# Patient Record
Sex: FEMALE | Race: BLACK OR AFRICAN AMERICAN | Employment: UNEMPLOYED | URBAN - METROPOLITAN AREA
[De-identification: names, ages, dates, MRNs, and addresses within clinical notes are randomized per-mention and may not be internally consistent; named-entity substitution may affect disease eponyms.]

---

## 2019-02-04 PROCEDURE — 87086 URINE CULTURE/COLONY COUNT: CPT | Performed by: OBSTETRICS & GYNECOLOGY

## 2019-02-04 PROCEDURE — 86900 BLOOD TYPING SEROLOGIC ABO: CPT | Performed by: OBSTETRICS & GYNECOLOGY

## 2019-02-04 PROCEDURE — 87389 HIV-1 AG W/HIV-1&-2 AB AG IA: CPT | Performed by: OBSTETRICS & GYNECOLOGY

## 2019-02-04 PROCEDURE — 86901 BLOOD TYPING SEROLOGIC RH(D): CPT | Performed by: OBSTETRICS & GYNECOLOGY

## 2019-02-04 PROCEDURE — 87088 URINE BACTERIA CULTURE: CPT | Performed by: OBSTETRICS & GYNECOLOGY

## 2019-02-04 PROCEDURE — 85660 RBC SICKLE CELL TEST: CPT | Performed by: OBSTETRICS & GYNECOLOGY

## 2019-02-04 PROCEDURE — 86850 RBC ANTIBODY SCREEN: CPT | Performed by: OBSTETRICS & GYNECOLOGY

## 2019-02-04 PROCEDURE — 87186 SC STD MICRODIL/AGAR DIL: CPT | Performed by: OBSTETRICS & GYNECOLOGY

## 2019-02-13 PROBLEM — Z34.00 ENCOUNTER FOR SUPERVISION PREGNANCY IN PRIMIGRAVIDA, ANTEPARTUM: Status: ACTIVE | Noted: 2019-02-13

## 2019-02-13 PROBLEM — Z34.00 ENCOUNTER FOR SUPERVISION PREGNANCY IN PRIMIGRAVIDA, ANTEPARTUM (HCC): Status: ACTIVE | Noted: 2019-02-13

## 2019-03-04 PROBLEM — N30.00 ACUTE CYSTITIS WITHOUT HEMATURIA: Status: ACTIVE | Noted: 2019-03-04

## 2019-03-04 PROBLEM — A74.9 CHLAMYDIA INFECTION: Status: ACTIVE | Noted: 2019-03-04

## 2019-03-04 PROCEDURE — 87086 URINE CULTURE/COLONY COUNT: CPT | Performed by: OBSTETRICS & GYNECOLOGY

## 2019-04-30 PROCEDURE — 87205 SMEAR GRAM STAIN: CPT | Performed by: EMERGENCY MEDICINE

## 2019-04-30 PROCEDURE — 87077 CULTURE AEROBIC IDENTIFY: CPT | Performed by: EMERGENCY MEDICINE

## 2019-04-30 PROCEDURE — 87070 CULTURE OTHR SPECIMN AEROBIC: CPT | Performed by: EMERGENCY MEDICINE

## 2019-04-30 PROCEDURE — 87076 CULTURE ANAEROBE IDENT EACH: CPT | Performed by: EMERGENCY MEDICINE

## 2019-04-30 PROCEDURE — 87075 CULTR BACTERIA EXCEPT BLOOD: CPT | Performed by: EMERGENCY MEDICINE

## 2019-04-30 PROCEDURE — 87147 CULTURE TYPE IMMUNOLOGIC: CPT | Performed by: EMERGENCY MEDICINE

## 2019-04-30 PROCEDURE — 87186 SC STD MICRODIL/AGAR DIL: CPT | Performed by: EMERGENCY MEDICINE

## 2019-07-02 PROCEDURE — 87389 HIV-1 AG W/HIV-1&-2 AB AG IA: CPT | Performed by: NURSE PRACTITIONER

## 2019-08-01 PROBLEM — O99.013 ANEMIA AFFECTING PREGNANCY IN THIRD TRIMESTER: Status: ACTIVE | Noted: 2019-08-01

## 2019-08-01 PROBLEM — O99.013 ANEMIA AFFECTING PREGNANCY IN THIRD TRIMESTER (HCC): Status: ACTIVE | Noted: 2019-08-01

## 2019-08-01 PROCEDURE — 87086 URINE CULTURE/COLONY COUNT: CPT | Performed by: OBSTETRICS & GYNECOLOGY

## 2019-08-16 PROCEDURE — 87086 URINE CULTURE/COLONY COUNT: CPT | Performed by: OBSTETRICS & GYNECOLOGY

## 2019-09-30 PROBLEM — O99.013 ANEMIA AFFECTING PREGNANCY IN THIRD TRIMESTER: Status: RESOLVED | Noted: 2019-08-01 | Resolved: 2019-09-28

## 2019-09-30 PROBLEM — N30.00 ACUTE CYSTITIS WITHOUT HEMATURIA: Status: RESOLVED | Noted: 2019-03-04 | Resolved: 2019-09-28

## 2019-09-30 PROBLEM — O99.013 ANEMIA AFFECTING PREGNANCY IN THIRD TRIMESTER (HCC): Status: RESOLVED | Noted: 2019-08-01 | Resolved: 2019-09-28

## 2019-09-30 PROBLEM — Z34.00 ENCOUNTER FOR SUPERVISION PREGNANCY IN PRIMIGRAVIDA, ANTEPARTUM (HCC): Status: RESOLVED | Noted: 2019-02-13 | Resolved: 2019-09-28

## 2019-09-30 PROBLEM — A74.9 CHLAMYDIA INFECTION: Status: RESOLVED | Noted: 2019-03-04 | Resolved: 2019-09-28

## 2019-09-30 PROBLEM — Z34.00 ENCOUNTER FOR SUPERVISION PREGNANCY IN PRIMIGRAVIDA, ANTEPARTUM: Status: RESOLVED | Noted: 2019-02-13 | Resolved: 2019-09-28

## 2020-04-06 PROBLEM — L08.9 SKIN INFECTION: Status: ACTIVE | Noted: 2020-04-06

## 2021-05-04 PROBLEM — L08.9 SKIN INFECTION: Status: RESOLVED | Noted: 2020-04-06 | Resolved: 2021-05-04

## 2024-02-28 LAB
AMB EXT TREPONEMAL ANTIBODIES: NONREACTIVE
ANTIBODY SCREEN OB: NEGATIVE
HEPATITIS B SURFACE ANTIGEN OB: NEGATIVE
HIV RESULT OB: NEGATIVE

## 2024-03-25 LAB — AMB EXT CHLAMYDIA, NUCLEIC ACID AMP: NOT DETECTED

## 2024-07-23 LAB
AMB EXT TREPONEMAL ANTIBODIES: NONREACTIVE
HIV RESULT OB: NEGATIVE

## 2024-09-09 LAB — STREP GP B CULT OB: NEGATIVE

## 2024-09-28 ENCOUNTER — LAB ENCOUNTER (OUTPATIENT)
Dept: LAB | Facility: HOSPITAL | Age: 24
End: 2024-09-28
Attending: OBSTETRICS & GYNECOLOGY
Payer: MEDICAID

## 2024-09-28 DIAGNOSIS — Z01.818 PREOP EXAMINATION: Primary | ICD-10-CM

## 2024-09-28 LAB
ANTIBODY SCREEN: NEGATIVE
RH BLOOD TYPE: POSITIVE

## 2024-09-28 PROCEDURE — 36415 COLL VENOUS BLD VENIPUNCTURE: CPT

## 2024-09-28 PROCEDURE — 86901 BLOOD TYPING SEROLOGIC RH(D): CPT

## 2024-09-28 PROCEDURE — 86850 RBC ANTIBODY SCREEN: CPT

## 2024-09-28 PROCEDURE — 86900 BLOOD TYPING SEROLOGIC ABO: CPT

## 2024-09-30 PROBLEM — Z98.891 HISTORY OF CESAREAN DELIVERY: Status: ACTIVE | Noted: 2024-09-30

## 2024-09-30 RX ORDER — FAMOTIDINE 10 MG/ML
20 INJECTION, SOLUTION INTRAVENOUS ONCE
Status: COMPLETED | OUTPATIENT
Start: 2024-09-30 | End: 2024-10-01

## 2024-09-30 RX ORDER — CITRIC ACID/SODIUM CITRATE 334-500MG
30 SOLUTION, ORAL ORAL ONCE
Status: COMPLETED | OUTPATIENT
Start: 2024-09-30 | End: 2024-10-01

## 2024-09-30 RX ORDER — ACETAMINOPHEN 500 MG
1000 TABLET ORAL ONCE
Status: COMPLETED | OUTPATIENT
Start: 2024-09-30 | End: 2024-10-01

## 2024-09-30 RX ORDER — METOCLOPRAMIDE HYDROCHLORIDE 5 MG/ML
10 INJECTION INTRAMUSCULAR; INTRAVENOUS ONCE
Status: COMPLETED | OUTPATIENT
Start: 2024-09-30 | End: 2024-10-01

## 2024-09-30 RX ORDER — FAMOTIDINE 20 MG/1
20 TABLET, FILM COATED ORAL ONCE
Status: COMPLETED | OUTPATIENT
Start: 2024-09-30 | End: 2024-10-01

## 2024-09-30 RX ORDER — SODIUM CHLORIDE, SODIUM LACTATE, POTASSIUM CHLORIDE, CALCIUM CHLORIDE 600; 310; 30; 20 MG/100ML; MG/100ML; MG/100ML; MG/100ML
125 INJECTION, SOLUTION INTRAVENOUS CONTINUOUS
Status: DISCONTINUED | OUTPATIENT
Start: 2024-09-30 | End: 2024-10-01 | Stop reason: HOSPADM

## 2024-09-30 RX ORDER — ONDANSETRON 2 MG/ML
4 INJECTION INTRAMUSCULAR; INTRAVENOUS EVERY 6 HOURS PRN
Status: DISCONTINUED | OUTPATIENT
Start: 2024-09-30 | End: 2024-10-01 | Stop reason: HOSPADM

## 2024-09-30 RX ORDER — METOCLOPRAMIDE 10 MG/1
10 TABLET ORAL ONCE
Status: COMPLETED | OUTPATIENT
Start: 2024-09-30 | End: 2024-10-01

## 2024-10-01 ENCOUNTER — ANESTHESIA EVENT (OUTPATIENT)
Dept: OBGYN UNIT | Facility: HOSPITAL | Age: 24
End: 2024-10-01
Payer: MEDICAID

## 2024-10-01 ENCOUNTER — HOSPITAL ENCOUNTER (INPATIENT)
Facility: HOSPITAL | Age: 24
LOS: 2 days | Discharge: HOME | End: 2024-10-03
Attending: OBSTETRICS & GYNECOLOGY | Admitting: OBSTETRICS & GYNECOLOGY
Payer: MEDICAID

## 2024-10-01 ENCOUNTER — ANESTHESIA (OUTPATIENT)
Dept: OBGYN UNIT | Facility: HOSPITAL | Age: 24
End: 2024-10-01
Payer: MEDICAID

## 2024-10-01 LAB
BASOPHILS # BLD AUTO: 0.02 X10(3) UL (ref 0–0.2)
BASOPHILS NFR BLD AUTO: 0.3 %
DEPRECATED RDW RBC AUTO: 41.3 FL (ref 35.1–46.3)
EOSINOPHIL # BLD AUTO: 0.11 X10(3) UL (ref 0–0.7)
EOSINOPHIL NFR BLD AUTO: 1.9 %
ERYTHROCYTE [DISTWIDTH] IN BLOOD BY AUTOMATED COUNT: 12.8 % (ref 11–15)
HCT VFR BLD AUTO: 33.3 %
HGB BLD-MCNC: 11.6 G/DL
IMM GRANULOCYTES # BLD AUTO: 0.04 X10(3) UL (ref 0–1)
IMM GRANULOCYTES NFR BLD: 0.7 %
LYMPHOCYTES # BLD AUTO: 1.21 X10(3) UL (ref 1–4)
LYMPHOCYTES NFR BLD AUTO: 20.8 %
MCH RBC QN AUTO: 30.9 PG (ref 26–34)
MCHC RBC AUTO-ENTMCNC: 34.8 G/DL (ref 31–37)
MCV RBC AUTO: 88.8 FL
MONOCYTES # BLD AUTO: 0.48 X10(3) UL (ref 0.1–1)
MONOCYTES NFR BLD AUTO: 8.2 %
NEUTROPHILS # BLD AUTO: 3.96 X10 (3) UL (ref 1.5–7.7)
NEUTROPHILS # BLD AUTO: 3.96 X10(3) UL (ref 1.5–7.7)
NEUTROPHILS NFR BLD AUTO: 68.1 %
PLATELET # BLD AUTO: 156 10(3)UL (ref 150–450)
RBC # BLD AUTO: 3.75 X10(6)UL
T PALLIDUM AB SER QL IA: NONREACTIVE
WBC # BLD AUTO: 5.8 X10(3) UL (ref 4–11)

## 2024-10-01 PROCEDURE — 86780 TREPONEMA PALLIDUM: CPT | Performed by: OBSTETRICS & GYNECOLOGY

## 2024-10-01 PROCEDURE — S0028 INJECTION, FAMOTIDINE, 20 MG: HCPCS | Performed by: STUDENT IN AN ORGANIZED HEALTH CARE EDUCATION/TRAINING PROGRAM

## 2024-10-01 PROCEDURE — 85025 COMPLETE CBC W/AUTO DIFF WBC: CPT | Performed by: STUDENT IN AN ORGANIZED HEALTH CARE EDUCATION/TRAINING PROGRAM

## 2024-10-01 RX ORDER — KETOROLAC TROMETHAMINE 30 MG/ML
30 INJECTION, SOLUTION INTRAMUSCULAR; INTRAVENOUS EVERY 6 HOURS
Status: DISPENSED | OUTPATIENT
Start: 2024-10-01 | End: 2024-10-02

## 2024-10-01 RX ORDER — ONDANSETRON 2 MG/ML
4 INJECTION INTRAMUSCULAR; INTRAVENOUS ONCE AS NEEDED
Status: COMPLETED | OUTPATIENT
Start: 2024-10-01 | End: 2024-10-01

## 2024-10-01 RX ORDER — AMMONIA INHALANTS 0.04 G/.3ML
0.3 INHALANT RESPIRATORY (INHALATION) AS NEEDED
Status: DISCONTINUED | OUTPATIENT
Start: 2024-10-01 | End: 2024-10-03

## 2024-10-01 RX ORDER — NALOXONE HYDROCHLORIDE 0.4 MG/ML
0.08 INJECTION, SOLUTION INTRAMUSCULAR; INTRAVENOUS; SUBCUTANEOUS
Status: ACTIVE | OUTPATIENT
Start: 2024-10-01 | End: 2024-10-02

## 2024-10-01 RX ORDER — KETOROLAC TROMETHAMINE 30 MG/ML
30 INJECTION, SOLUTION INTRAMUSCULAR; INTRAVENOUS ONCE
Status: COMPLETED | OUTPATIENT
Start: 2024-10-01 | End: 2024-10-01

## 2024-10-01 RX ORDER — HYDROCODONE BITARTRATE AND ACETAMINOPHEN 7.5; 325 MG/1; MG/1
1 TABLET ORAL EVERY 6 HOURS PRN
Status: ACTIVE | OUTPATIENT
Start: 2024-10-01 | End: 2024-10-02

## 2024-10-01 RX ORDER — DEXTROSE, SODIUM CHLORIDE, SODIUM LACTATE, POTASSIUM CHLORIDE, AND CALCIUM CHLORIDE 5; .6; .31; .03; .02 G/100ML; G/100ML; G/100ML; G/100ML; G/100ML
INJECTION, SOLUTION INTRAVENOUS CONTINUOUS
Status: DISCONTINUED | OUTPATIENT
Start: 2024-10-01 | End: 2024-10-03

## 2024-10-01 RX ORDER — LIDOCAINE HYDROCHLORIDE 10 MG/ML
INJECTION, SOLUTION INFILTRATION; PERINEURAL
Status: COMPLETED | OUTPATIENT
Start: 2024-10-01 | End: 2024-10-01

## 2024-10-01 RX ORDER — BISACODYL 10 MG
10 SUPPOSITORY, RECTAL RECTAL ONCE AS NEEDED
Status: DISCONTINUED | OUTPATIENT
Start: 2024-10-01 | End: 2024-10-03

## 2024-10-01 RX ORDER — HYDROMORPHONE HYDROCHLORIDE 1 MG/ML
0.4 INJECTION, SOLUTION INTRAMUSCULAR; INTRAVENOUS; SUBCUTANEOUS
Status: ACTIVE | OUTPATIENT
Start: 2024-10-01 | End: 2024-10-02

## 2024-10-01 RX ORDER — DOCUSATE SODIUM 100 MG/1
100 CAPSULE, LIQUID FILLED ORAL
Status: DISCONTINUED | OUTPATIENT
Start: 2024-10-01 | End: 2024-10-03

## 2024-10-01 RX ORDER — MORPHINE SULFATE 1 MG/ML
INJECTION, SOLUTION EPIDURAL; INTRATHECAL; INTRAVENOUS
Status: COMPLETED | OUTPATIENT
Start: 2024-10-01 | End: 2024-10-01

## 2024-10-01 RX ORDER — NALBUPHINE HYDROCHLORIDE 10 MG/ML
2.5 INJECTION, SOLUTION INTRAMUSCULAR; INTRAVENOUS; SUBCUTANEOUS EVERY 4 HOURS PRN
Status: ACTIVE | OUTPATIENT
Start: 2024-10-01 | End: 2024-10-02

## 2024-10-01 RX ORDER — HYDROMORPHONE HYDROCHLORIDE 1 MG/ML
0.2 INJECTION, SOLUTION INTRAMUSCULAR; INTRAVENOUS; SUBCUTANEOUS EVERY 2 HOUR PRN
Status: DISCONTINUED | OUTPATIENT
Start: 2024-10-01 | End: 2024-10-03

## 2024-10-01 RX ORDER — BUPIVACAINE HYDROCHLORIDE 7.5 MG/ML
INJECTION, SOLUTION INTRASPINAL
Status: COMPLETED | OUTPATIENT
Start: 2024-10-01 | End: 2024-10-01

## 2024-10-01 RX ORDER — CHOLECALCIFEROL (VITAMIN D3) 25 MCG
1 TABLET,CHEWABLE ORAL DAILY
Status: DISCONTINUED | OUTPATIENT
Start: 2024-10-01 | End: 2024-10-03

## 2024-10-01 RX ORDER — NALBUPHINE HYDROCHLORIDE 10 MG/ML
2.5 INJECTION, SOLUTION INTRAMUSCULAR; INTRAVENOUS; SUBCUTANEOUS
Status: DISCONTINUED | OUTPATIENT
Start: 2024-10-01 | End: 2024-10-03

## 2024-10-01 RX ORDER — ACETAMINOPHEN 500 MG
1000 TABLET ORAL EVERY 6 HOURS
Status: DISCONTINUED | OUTPATIENT
Start: 2024-10-01 | End: 2024-10-03

## 2024-10-01 RX ORDER — HALOPERIDOL 5 MG/ML
0.5 INJECTION INTRAMUSCULAR ONCE AS NEEDED
Status: ACTIVE | OUTPATIENT
Start: 2024-10-01 | End: 2024-10-01

## 2024-10-01 RX ORDER — SIMETHICONE 80 MG
80 TABLET,CHEWABLE ORAL 3 TIMES DAILY PRN
Status: DISCONTINUED | OUTPATIENT
Start: 2024-10-01 | End: 2024-10-03

## 2024-10-01 RX ORDER — PROCHLORPERAZINE EDISYLATE 5 MG/ML
5 INJECTION INTRAMUSCULAR; INTRAVENOUS ONCE AS NEEDED
Status: ACTIVE | OUTPATIENT
Start: 2024-10-01 | End: 2024-10-01

## 2024-10-01 RX ORDER — SODIUM CHLORIDE 9 MG/ML
INJECTION, SOLUTION INTRAVENOUS CONTINUOUS PRN
Status: DISCONTINUED | OUTPATIENT
Start: 2024-10-01 | End: 2024-10-01 | Stop reason: SURG

## 2024-10-01 RX ORDER — HYDROMORPHONE HYDROCHLORIDE 1 MG/ML
0.6 INJECTION, SOLUTION INTRAMUSCULAR; INTRAVENOUS; SUBCUTANEOUS
Status: ACTIVE | OUTPATIENT
Start: 2024-10-01 | End: 2024-10-02

## 2024-10-01 RX ORDER — GABAPENTIN 300 MG/1
300 CAPSULE ORAL EVERY 8 HOURS PRN
Status: DISCONTINUED | OUTPATIENT
Start: 2024-10-01 | End: 2024-10-03

## 2024-10-01 RX ORDER — HYDROMORPHONE HYDROCHLORIDE 2 MG/1
2 TABLET ORAL EVERY 4 HOURS PRN
Status: DISCONTINUED | OUTPATIENT
Start: 2024-10-01 | End: 2024-10-03

## 2024-10-01 RX ORDER — ONDANSETRON 2 MG/ML
4 INJECTION INTRAMUSCULAR; INTRAVENOUS ONCE AS NEEDED
Status: ACTIVE | OUTPATIENT
Start: 2024-10-01 | End: 2024-10-01

## 2024-10-01 RX ORDER — DIPHENHYDRAMINE HYDROCHLORIDE 50 MG/ML
12.5 INJECTION INTRAMUSCULAR; INTRAVENOUS EVERY 4 HOURS PRN
Status: DISCONTINUED | OUTPATIENT
Start: 2024-10-01 | End: 2024-10-02

## 2024-10-01 RX ORDER — CHOLECALCIFEROL (VITAMIN D3) 25 MCG
1 TABLET,CHEWABLE ORAL DAILY
COMMUNITY

## 2024-10-01 RX ORDER — FERROUS SULFATE 325(65) MG
325 TABLET, DELAYED RELEASE (ENTERIC COATED) ORAL
COMMUNITY

## 2024-10-01 RX ORDER — HYDROCODONE BITARTRATE AND ACETAMINOPHEN 7.5; 325 MG/1; MG/1
2 TABLET ORAL EVERY 6 HOURS PRN
Status: ACTIVE | OUTPATIENT
Start: 2024-10-01 | End: 2024-10-02

## 2024-10-01 RX ORDER — ONDANSETRON 2 MG/ML
4 INJECTION INTRAMUSCULAR; INTRAVENOUS EVERY 6 HOURS PRN
Status: DISCONTINUED | OUTPATIENT
Start: 2024-10-01 | End: 2024-10-03

## 2024-10-01 RX ORDER — IBUPROFEN 600 MG/1
600 TABLET, FILM COATED ORAL EVERY 6 HOURS
Status: DISCONTINUED | OUTPATIENT
Start: 2024-10-02 | End: 2024-10-03

## 2024-10-01 RX ORDER — PHENYLEPHRINE HCL 10 MG/ML
VIAL (ML) INJECTION AS NEEDED
Status: DISCONTINUED | OUTPATIENT
Start: 2024-10-01 | End: 2024-10-01 | Stop reason: SURG

## 2024-10-01 RX ORDER — ACETAMINOPHEN 325 MG/1
650 TABLET ORAL EVERY 6 HOURS PRN
Status: ACTIVE | OUTPATIENT
Start: 2024-10-01 | End: 2024-10-02

## 2024-10-01 RX ORDER — DIPHENHYDRAMINE HCL 25 MG
25 CAPSULE ORAL EVERY 4 HOURS PRN
Status: DISCONTINUED | OUTPATIENT
Start: 2024-10-01 | End: 2024-10-02

## 2024-10-01 RX ORDER — SODIUM CHLORIDE, SODIUM LACTATE, POTASSIUM CHLORIDE, CALCIUM CHLORIDE 600; 310; 30; 20 MG/100ML; MG/100ML; MG/100ML; MG/100ML
INJECTION, SOLUTION INTRAVENOUS CONTINUOUS
Status: DISCONTINUED | OUTPATIENT
Start: 2024-10-01 | End: 2024-10-03

## 2024-10-01 RX ADMIN — BUPIVACAINE HYDROCHLORIDE 1.5 ML: 7.5 INJECTION, SOLUTION INTRASPINAL at 07:43:00

## 2024-10-01 RX ADMIN — PHENYLEPHRINE HCL 100 MCG: 10 MG/ML VIAL (ML) INJECTION at 07:48:00

## 2024-10-01 RX ADMIN — SODIUM CHLORIDE: 9 INJECTION, SOLUTION INTRAVENOUS at 07:43:00

## 2024-10-01 RX ADMIN — LIDOCAINE HYDROCHLORIDE 5 ML: 10 INJECTION, SOLUTION INFILTRATION; PERINEURAL at 07:43:00

## 2024-10-01 RX ADMIN — MORPHINE SULFATE 0.3 MG: 1 INJECTION, SOLUTION EPIDURAL; INTRATHECAL; INTRAVENOUS at 07:43:00

## 2024-10-01 NOTE — ANESTHESIA POSTPROCEDURE EVALUATION
Patient: Rachel Stephenson    Procedure Summary       Date: 10/01/24 Room / Location: [facility] L+D OR  [facility] L+D OR    Anesthesia Start: 738 Anesthesia Stop:     Procedure:  SECTION (Abdomen) Diagnosis: (repeat c/s)    Surgeons: Ivon Morrissey MD Anesthesiologist: Demetrio Underwood MD    Anesthesia Type: spinal ASA Status: 2            Anesthesia Type: spinal    Vitals Value Taken Time   BP 96/42 10/01/24 0846   Temp 97.7 °F (36.5 °C) 10/01/24 0846   Pulse 101 10/01/24 0846   Resp 26 10/01/24 0846   SpO2 97 % 10/01/24 0846   Vitals shown include unfiled device data.    [facility] AN Post Evaluation:   Patient Evaluated in PACU  Patient Participation: complete - patient participated  Level of Consciousness: awake  Pain Score: 0  Pain Management: adequate  Airway Patency:patent  Dental exam unchanged from preop  Yes    Cardiovascular Status: acceptable  Respiratory Status: acceptable  Postoperative Hydration acceptable      DEMETRIO UNDERWOOD MD  10/1/2024 8:46 AM

## 2024-10-01 NOTE — ANESTHESIA PROCEDURE NOTES
Spinal Block    Date/Time: 10/1/2024 7:41 AM    Performed by: Doug Marinelli MD  Authorized by: Doug Marinelli MD      General Information and Staff    Start Time:  10/1/2024 7:41 AM  End Time:  10/1/2024 7:43 AM  Anesthesiologist:  Doug Marinelli MD  Performed by:  Anesthesiologist  Site identification: surface landmarks    Preanesthetic Checklist: patient identified, IV checked, risks and benefits discussed, monitors and equipment checked, pre-op evaluation, timeout performed, anesthesia consent and sterile technique used      Procedure Details    Patient Position:  Sitting  Prep: ChloraPrep    Monitoring:  Cardiac monitor  Approach:  Midline  Location:  L4-5  Injection Technique:  Single-shot    Needle    Needle Type:  Pencil-tip  Needle Gauge:  24 G  Needle Length:  3.5 in  Needle Insertion Depth:  3    Assessment    Sensory Level:  T6  Events: clear CSF, CSF aspirated, well tolerated and blood negative      Additional Comments     First pass

## 2024-10-01 NOTE — PROGRESS NOTES
Pt is a 24 year old female admitted to TR5/TR5-A.     Chief Complaint   Patient presents with    Scheduled      Repeat scheduled      Pt is  39w4d intra-uterine pregnancy.  History obtained, consents signed. Oriented to room, staff, and plan of care.

## 2024-10-01 NOTE — PROGRESS NOTES
Patient transferred to mother/baby room 356 per cart in stable condition with baby and personal belongings.  Accompanied by care partner and staff.  Report given to mother/baby Zee RATLIFF.

## 2024-10-01 NOTE — BRIEF OP NOTE
[facility]  part of [facility]     Section Delivery Note    Rachel Stephenson Patient Status:  Inpatient    2000 MRN [id number]   Location [facility] Attending Ivon Morrissey MD   Hosp Day # 0 PCP Jamila Wang MD     Pre Op Diagnosis: IUP at 39 weeks and history of prior  section  Post Op Diagnosis: same as pre-op  Procedure:   Surgical/Procedural Cases on this Admission       Case IDs Date Procedure Surgeon Location Status    [id number] 10/1/24  SECTION Ivon Morrissey MD [facility] L+D OR [facility]    Surgeon:  Ivon Morrissey MD  Assistant Surgeon:  Nelia Olmos MD, The involvement of the assisting physician was necessary in order to provide aid in exposure/retraction, hemostasis, closure, and other intraoperative technical functions in order to facilitate me as the primary surgeon carry out a safe operation with optimized results/outcome.     Anesthesia: spinal  Complications: none  Neonatologist Present: yes  Findings: normal uterus, normal tubes, and normal ovaries    Delivery     Infant  Date of Delivery: 10/1/2024    Time of Delivery: 8:05 AM   Delivery Type: Caesarean Section     Infant Sex/Birthweight: female 7 lb 7.9 oz (3.4 kg)     Presentation Vertex [1]   Position Left [1]  Occiput [1]  Anterior [1]     Apgars:  1 minute: 9                5 minutes: 9                         10 minutes:      Placenta  Date/Time of Delivery: 10/1/2024  8:07 AM    Delivery: manual extraction  Placenta to Pathology: no  Cord Gases Submitted: no  Cord Blood Collection: no  Cord Tissue Collection:no   Cord Complications: none  Sponge and Needle Counts:  Verified: yes  Delivery Comment:     Dictation Number:  [id number]      Input/Output   EBL:  400ml       Ivon Morrissey MD  10/1/2024  8:59 AM

## 2024-10-01 NOTE — PLAN OF CARE

## 2024-10-01 NOTE — H&P
[facility]  part of [facility]    History & Physical    Rachel Stephenson Patient Status:  Inpatient    2000 MRN [id number]   Location [facility] Attending Ivon Morrissey MD   Hosp Day # 0 PCP Jamila Wang MD     Date of Admission:  10/1/2024    SUBJECTIVE:    Rachel Stephenson is a 24 year old  female with 10/4/2024, by Last Menstrual Period   at 39w4d gestation who is being admitted for .  Her current obstetrical history is significant for prior , hx chlamydia in early pregnancy .  Patient reports no complaints and good fetal movement .   Fetal Movement: normal.     Obstetric History:   OB History    Para Term  AB Living   2 1 1 0 0 1   SAB IAB Ectopic Multiple Live Births   0 0 0 0 1      # Outcome Date GA Lbr Reid/2nd Weight Sex Type Anes PTL Lv   2 Current            1 Term 19 41w1d  9 lb 5 oz (4.224 kg) M CS-LTranv EPI N CORNELIO      Complications: Failure to progress      Obstetric Comments   Sees gyne   Last pap: 2021   Is on OCPs     Past Medical History:   Past Medical History:    Allergic rhinitis     Past Social History:   Past Surgical History:   Procedure Laterality Date           Family History:   Family History   Problem Relation Age of Onset    Hypertension Father     Cancer Maternal Grandmother         colon    Cancer Maternal Grandfather         lung     Social History:   Social History     Tobacco Use    Smoking status: Never    Smokeless tobacco: Never   Substance Use Topics    Alcohol use: Not Currently     Alcohol/week: 0.0 standard drinks of alcohol       Home Meds:   Medications Prior to Admission   Medication Sig Dispense Refill Last Dose    prenatal vitamin with DHA 27-0.8-228 MG Oral Cap Take 1 capsule by mouth daily.   2024    ferrous sulfate 325 (65 FE) MG Oral Tab EC Take 1 tablet (325 mg total) by mouth daily with breakfast.   2024    Norgestim-Eth Estrad Triphasic  0.18/0.215/0.25 MG-25 MCG Oral Tab Take 1 tablet by mouth daily. 84 tablet 1     Probiotic Product (PROBIOTIC DAILY OR) Take by mouth.        Allergies: No Known Allergies    OBJECTIVE:    Temp:  [98.8 °F (37.1 °C)] 98.8 °F (37.1 °C)  Pulse:  [108] 108  BP: (125)/(74) 125/74    Lungs:   clear to auscultation bilaterally   Heart:   regular rate and rhythm, S1, S2 normal, no murmur, click, rub or gallop   Abdomen: FHT present   Fetal Surveillance:  125 BPM   Fetal heart variability: moderate  Fetal Heart Rate accelerations: yes  Fetal Non-stress Test: reactive      Cervix: not digitally examined     Lab Review:  A, Rh+, Rubella-immune, Hepatitis B surface antigen non-reactive, GBS negative  Lab Results   Component Value Date    WBC 5.8 10/01/2024    HGB 11.6 10/01/2024    HCT 33.3 10/01/2024    .0 10/01/2024          ASSESSMENT:    39w4d weeks gestation.  Not in labor.  Obstetrical history significant for prior , hx chlamydia .    PLAN:    Risks, benefits, alternatives and possible complications have been discussed in detail with the patient.  Pre-admission, admission, and post admission procedures and expectations were discussed in detail.  All questions answered, all appropriate consents will be signed at the Hospital. Admission is planned for today.   Intervention: plan  delivery.    Ivon Morrissey MD  10/1/2024  7:12 AM

## 2024-10-01 NOTE — OPERATIVE REPORT
[facility]    PATIENT'S NAME: AUGUST HUERTA   ATTENDING PHYSICIAN: Ivon Morrissey MD   OPERATING PHYSICIAN: Ivon Morrissey MD   PATIENT ACCOUNT#:   [id number]    LOCATION:  [facility]  MEDICAL RECORD #:   [id number]       YOB: 2000  ADMISSION DATE:       10/01/2024      OPERATION DATE:  10/01/2024    OPERATIVE REPORT      PREOPERATIVE DIAGNOSIS:  Intrauterine pregnancy at 39 weeks, history of prior  section.  POSTOPERATIVE DIAGNOSIS:  Intrauterine pregnancy at 39 weeks, history of prior  section.  PROCEDURE:  Low transverse  section.    ASSISTANT SURGEON:  Nelia Olmos M.D..  The involvement of the assisting physician was necessary in order to provide aid in exposure, retraction, hemostasis, closure, and other intraoperative technical functions to facilitate me as the primary surgeon to carry out a safe operation with optimized results and outcome.    ANESTHESIA:  Spinal anesthesia.    COMPLICATIONS:  None.    INDICATIONS:  The patient is a 24-year-old, G3, P1-0-0-1, at 39-4/7 weeks who presents for repeat  section.  Risks, benefits, and alternatives had been discussed with the patient.  The patient was agreeable to proceed.      FINDINGS:  Neonatologist was present.  Normal uterus, tubes, and ovaries.  Female infant delivered at 8:05 a.m., 7 pounds 8 ounces, vertex.  Vacuum assist was used to help deliver the head.     OPERATIVE TECHNIQUE:  The patient was taken to the operating where she was given a spinal anesthesia, prepped and draped in the usual sterile manner in dorsal supine position with leftward tilt.  Lee catheter had been placed.  Pfannenstiel skin incision was made with scalpel, carried down to the underlying layer of fascia.  Fascia nicked in the midline, extended laterally with Yen scissors.  Rectus muscles dissected off bluntly and sharply.  Peritoneal cavity entered bluntly and extended sharply with good visualization of the  bladder.  Bladder blade inserted.  Vesicouterine peritoneum identified, grasped with smooth pickups, entered sharply, extended laterally.  Bladder flap created digitally.  Bladder blade inserted behind the bladder flap.  Low transverse uterine incision made with scalpel and extended bluntly.  Head was brought to the incision.  Amniotomy of clear fluid.  There was some difficulty delivering head.  Vacuum was used to assist in delivery of the head.  There was 1 pop-off.  Mouth and nares bulb suctioned on the field.  Body was delivered.  Cord clamped x2 and cut after 30 seconds.  The placenta was delivered.  Uterus was exteriorized and cleared of all clots and debris.  Uterine incision closed in 2 layers with 0 chromic in a running locked fashion.  Second imbricating layer of the same suture was used.  Figure-of-eight sutures were placed along the hysterotomy site for hemostasis.  Posterior cul-de-sac wiped out.  The uterus was returned to the abdomen.  Gutters were cleared of all clots and debris.  Uterine incision reexamined and found to be hemostatic.  Peritoneum reapproximated with 2-0 chromic in a running fashion.  The rectus muscles were reapproximated with 2-0 chromic interrupted suture.  Fascia closed with 0 Vicryl in a running fashion.  Subcutaneous adipose reapproximated with 2-0 plain gut.  Skin was closed with 4-0 Monocryl in a subcuticular fashion.  Sponge, lap, needle counts were correct x3.  The patient received 2 g of Ancef at the start of procedure.     Dictated By Ivon Morrissey MD  d: 10/01/2024 09:03:50  t: 10/01/2024 15:04:26  [facility] [id number]  /

## 2024-10-01 NOTE — ANESTHESIA PREPROCEDURE EVALUATION
Anesthesia PreOp Note    HPI:     Rachel Stephenson is a 24 year old female who presents for preoperative consultation requested by: Ivon Morrissey MD    Date of Surgery: 10/1/2024    Procedure(s):   SECTION  Indication: repeat c/s    Relevant Problems   No relevant active problems       NPO:  Last Liquid Consumption Date: 24  Last Liquid Consumption Time:   Last Solid Consumption Date: 24  Last Solid Consumption Time:   Last Liquid Consumption Date: 24          History Review:  Patient Active Problem List    Diagnosis Date Noted    History of  delivery 2024       Past Medical History:    Allergic rhinitis       Past Surgical History:   Procedure Laterality Date             Medications Prior to Admission   Medication Sig Dispense Refill Last Dose    prenatal vitamin with DHA 27-0.8-228 MG Oral Cap Take 1 capsule by mouth daily.   2024    ferrous sulfate 325 (65 FE) MG Oral Tab EC Take 1 tablet (325 mg total) by mouth daily with breakfast.   2024    Norgestim-Eth Estrad Triphasic 0.18/0.215/0.25 MG-25 MCG Oral Tab Take 1 tablet by mouth daily. 84 tablet 1     Probiotic Product (PROBIOTIC DAILY OR) Take by mouth.        Current Facility-Administered Medications Ordered in Epic   Medication Dose Route Frequency Provider Last Rate Last Admin    lactated ringers infusion  125 mL/hr Intravenous Continuous Libia Wilson  mL/hr at 10/01/24 0631 125 mL/hr at 10/01/24 0631    ondansetron (Zofran) 4 MG/2ML injection 4 mg  4 mg Intravenous Q6H PRN Libia Wilson DO        oxyTOCIN in sodium chloride 0.9% (Pitocin) 30 Units/500mL infusion premix  62.5-900 kiko-units/min Intravenous Continuous Libia Wilson DO        ceFAZolin (Ancef) 2g in 10mL IV syringe premix  2 g Intravenous Once Libia Wilson DO         No current [facility]-ordered outpatient medications on file.       No Known Allergies    Family History   Problem Relation Age of Onset     Hypertension Father     Cancer Maternal Grandmother         colon    Cancer Maternal Grandfather         lung     Social History     Socioeconomic History    Marital status: Single   Tobacco Use    Smoking status: Never    Smokeless tobacco: Never   Vaping Use    Vaping status: Never Used   Substance and Sexual Activity    Alcohol use: Not Currently     Alcohol/week: 0.0 standard drinks of alcohol    Drug use: No    Sexual activity: Yes     Partners: Male     Comment: 5/4/2021       Available pre-op labs reviewed.  Lab Results   Component Value Date    WBC 5.8 10/01/2024    RBC 3.75 (L) 10/01/2024    HGB 11.6 (L) 10/01/2024    HCT 33.3 (L) 10/01/2024    MCV 88.8 10/01/2024    MCH 30.9 10/01/2024    MCHC 34.8 10/01/2024    RDW 12.8 10/01/2024    .0 10/01/2024             Vital Signs:  Body mass index is 34.37 kg/m².   weight is 88 kg (194 lb). Her oral temperature is 98.8 °F (37.1 °C). Her blood pressure is 125/74 and her pulse is 108.   Vitals:    10/01/24 0536 10/01/24 0601   BP: 125/74    Pulse: 108    Temp: 98.8 °F (37.1 °C)    TempSrc: Oral    Weight:  88 kg (194 lb)        Anesthesia Evaluation     Patient summary reviewed and Nursing notes reviewed    Airway   Mallampati: I  TM distance: >3 FB  Neck ROM: full  Dental - Dentition appears grossly intact     Pulmonary - negative ROS and normal exam   Cardiovascular - negative ROS and normal exam    Neuro/Psych - negative ROS     GI/Hepatic/Renal - negative ROS     Endo/Other - negative ROS   Abdominal  - normal exam                 Anesthesia Plan:   ASA:  2  Plan:   Spinal  Post-op Pain Management: Intrathecal narcotics  Plan Comments: Possible general discussed  Informed Consent Plan and Risks Discussed With:  Patient  Use of Blood Products Discussed With:  Patient      I have informed Rachel Stephenson and/or legal guardian or family member of the nature of the anesthetic plan, benefits, risks including possible dental damage if relevant, major  complications, and any alternative forms of anesthetic management.   All of the patient's questions were answered to the best of my ability. The patient desires the anesthetic management as planned.  DEMETRIO UNDERWOOD MD  10/1/2024 7:16 AM  Present on Admission:   History of  delivery

## 2024-10-02 LAB
BASOPHILS # BLD AUTO: 0.01 X10(3) UL (ref 0–0.2)
BASOPHILS NFR BLD AUTO: 0.1 %
DEPRECATED RDW RBC AUTO: 41.8 FL (ref 35.1–46.3)
EOSINOPHIL # BLD AUTO: 0.09 X10(3) UL (ref 0–0.7)
EOSINOPHIL NFR BLD AUTO: 1.2 %
ERYTHROCYTE [DISTWIDTH] IN BLOOD BY AUTOMATED COUNT: 12.8 % (ref 11–15)
HCT VFR BLD AUTO: 29.8 %
HGB BLD-MCNC: 9.9 G/DL
IMM GRANULOCYTES # BLD AUTO: 0.02 X10(3) UL (ref 0–1)
IMM GRANULOCYTES NFR BLD: 0.3 %
LYMPHOCYTES # BLD AUTO: 0.8 X10(3) UL (ref 1–4)
LYMPHOCYTES NFR BLD AUTO: 10.3 %
MCH RBC QN AUTO: 30.2 PG (ref 26–34)
MCHC RBC AUTO-ENTMCNC: 33.2 G/DL (ref 31–37)
MCV RBC AUTO: 90.9 FL
MONOCYTES # BLD AUTO: 0.62 X10(3) UL (ref 0.1–1)
MONOCYTES NFR BLD AUTO: 8 %
NEUTROPHILS # BLD AUTO: 6.23 X10 (3) UL (ref 1.5–7.7)
NEUTROPHILS # BLD AUTO: 6.23 X10(3) UL (ref 1.5–7.7)
NEUTROPHILS NFR BLD AUTO: 80.1 %
PLATELET # BLD AUTO: 145 10(3)UL (ref 150–450)
RBC # BLD AUTO: 3.28 X10(6)UL
WBC # BLD AUTO: 7.8 X10(3) UL (ref 4–11)

## 2024-10-02 PROCEDURE — 85025 COMPLETE CBC W/AUTO DIFF WBC: CPT | Performed by: OBSTETRICS & GYNECOLOGY

## 2024-10-02 NOTE — PLAN OF CARE
Problem: Patient Centered Care  Goal: Patient preferences are identified and integrated in the patient's plan of care  Description: Interventions:  - What would you like us to know as we care for you?   - Provide timely, complete, and accurate information to patient/family  - Incorporate patient and family knowledge, values, beliefs, and cultural backgrounds into the planning and delivery of care  - Encourage patient/family to participate in care and decision-making at the level they choose  - Honor patient and family perspectives and choices  Outcome: Progressing     Problem: Patient/Family Goals  Goal: Patient/Family Long Term Goal  Description: Patient's Long Term Goal:     Interventions:  -   - See additional Care Plan goals for specific interventions  Outcome: Progressing  Goal: Patient/Family Short Term Goal  Description: Patient's Short Term Goal:     Interventions:   -   - See additional Care Plan goals for specific interventions  Outcome: Progressing     Problem: POSTPARTUM  Goal: Long Term Goal:Experiences normal postpartum course  Description: INTERVENTIONS:  - Assess and monitor vital signs and lab values.  - Assess fundus and lochia.  - Provide ice/sitz baths for perineum discomfort.  - Monitor healing of incision/episiotomy/laceration, and assess for signs and symptoms of infection and hematoma.  - Assess bladder function and monitor for bladder distention.  - Provide/instruct/assist with pericare as needed.  - Provide VTE prophylaxis as needed.  - Monitor bowel function.  - Encourage ambulation and provide assistance as needed.  - Assess and monitor emotional status and provide social service/psych resources as needed.  - Utilize standard precautions and use personal protective equipment as indicated. Ensure aseptic care of all intravenous lines and invasive tubes/drains.  - Obtain immunization and exposure to communicable diseases history.  Outcome: Progressing  Goal: Optimize infant feeding at the  breast  Description: INTERVENTIONS:  - Initiate breast feeding within first hour after birth.   - Monitor effectiveness of current breast feeding efforts.  - Assess support systems available to mother/family.  - Identify cultural beliefs/practices regarding lactation, letdown techniques, maternal food preferences.  - Assess mother's knowledge and previous experience with breast feeding.  - Provide information as needed about early infant feeding cues (e.g., rooting, lip smacking, sucking fingers/hand) versus late cue of crying.  - Discuss/demonstrate breast feeding aids (e.g., infant sling, nursing footstool/pillows, and breast pumps).  - Encourage mother/other family members to express feelings/concerns, and actively listen.  - Educate father/SO about benefits of breast feeding and how to manage common lactation challenges.  - Recommend avoidance of specific medications or substances incompatible with breast feeding.  - Assess and monitor for signs of nipple pain/trauma.  - Instruct and provide assistance with proper latch.  - Review techniques for milk expression (breast pumping) and storage of breast milk. Provide pumping equipment/supplies, instructions and assistance, as needed.  - Encourage rooming-in and breast feeding on demand.  - Encourage skin-to-skin contact.  - Provide LC support as needed.  - Assess for and manage engorgement.  - Provide breast feeding education handouts and information on community breast feeding support.   Outcome: Progressing  Goal: Establishment of adequate milk supply with medication/procedure interruptions  Description: INTERVENTIONS:  - Review techniques for milk expression (breast pumping).   - Provide pumping equipment/supplies, instructions, and assistance until it is safe to breastfeed infant.  Outcome: Progressing  Goal: Experiences normal breast weaning course  Description: INTERVENTIONS:  - Assess for and manage engorgement.  - Instruct on breast care.  - Provide comfort  measures.  Outcome: Progressing  Goal: Appropriate maternal -  bonding  Description: INTERVENTIONS:  - Assess caregiver- interactions.  - Assess caregiver's emotional status and coping mechanisms.  - Encourage caregiver to participate in  daily care.  - Assess support systems available to mother/family.  - Provide /case management support as needed.  Outcome: Progressing     Problem: GENITOURINARY - ADULT  Goal: Absence of urinary retention  Description: INTERVENTIONS:  - Assess patient’s ability to void and empty bladder  - Monitor intake/output and perform bladder scan as needed  - Follow urinary retention protocol/standard of care  - Consider collaborating with pharmacy to review patient's medication profile  - Implement strategies to promote bladder emptying  Outcome: Progressing    VSS, afebrile. Pt with intermittent pain relieved with Toradol and Tylenol. Lungs clear. BS active. Bridges in place with pelon urine. Encouraged to increase fluid intake. Plan to remove bridges in the morning. Per pt passing gas. Incision with ABD dressing dry and intact. Fundus is firm, U/U, bleeding is small. Plan of care reviewed with pt. No questions at this time. Will continue with plan of care.

## 2024-10-02 NOTE — PROGRESS NOTES
[facility]  part of [facility]    OB/Gyne Post  Section Progress Note      Rachel Stephenson Patient Status:  Inpatient    2000 MRN [id number]   Location [facility] 3SE Attending Ivon Morrissey MD   Hosp Day # 1 PCP Jamila Wang MD       Subjective     Pt denies N/V/F/C/CP/SOB/palpitations, dizziness.      Good pain control.   Tolerating present diet.   Ambulating well.  Voiding freely.    Breastfeeding Yes   Formula Feeding: No   Tolerating Diet Yes   Flatus: Yes   BM: No       Objective   Vital signs in last 24 hours:  Temp:  [98 °F (36.7 °C)-99.3 °F (37.4 °C)] 98.7 °F (37.1 °C)  Pulse:  [] 100  Resp:  [16-24] 16  BP: (106-125)/(50-80) 117/66  SpO2:  [94 %-100 %] 100 %    Input/Output:    Intake/Output Summary (Last 24 hours) at 10/2/2024 0905  Last data filed at 10/2/2024 0630  Gross per 24 hour   Intake 1229.08 ml   Output 2996 ml   Net -1766.92 ml       Constitutional: comfortable  Abdomen: soft; appropriately tender; non distended  BS: Present   Uterus: fundus 2 below  umbilicus,   non tender  Wound/Incision:  Clean / dry / intact; no erythema and no ecchymosis  Extremities: No calf tenderness, SCDs not present ,   1+ pitting edema.  Lochia: minimal      Results:   Labs / Path / Radiology:    Recent Results (from the past 24 hour(s))   CBC With Differential With Platelet    Collection Time: 10/02/24  6:09 AM   Result Value Ref Range    WBC 7.8 4.0 - 11.0 x10(3) uL    RBC 3.28 (L) 3.80 - 5.30 x10(6)uL    HGB 9.9 (L) 12.0 - 16.0 g/dL    HCT 29.8 (L) 35.0 - 48.0 %    MCV 90.9 80.0 - 100.0 fL    MCH 30.2 26.0 - 34.0 pg    MCHC 33.2 31.0 - 37.0 g/dL    RDW-SD 41.8 35.1 - 46.3 fL    RDW 12.8 11.0 - 15.0 %    .0 (L) 150.0 - 450.0 10(3)uL    Neutrophil Absolute Prelim 6.23 1.50 - 7.70 x10 (3) uL    Neutrophil Absolute 6.23 1.50 - 7.70 x10(3) uL    Lymphocyte Absolute 0.80 (L) 1.00 - 4.00 x10(3) uL    Monocyte Absolute 0.62 0.10 - 1.00 x10(3) uL    Eosinophil  Absolute 0.09 0.00 - 0.70 x10(3) uL    Basophil Absolute 0.01 0.00 - 0.20 x10(3) uL    Immature Granulocyte Absolute 0.02 0.00 - 1.00 x10(3) uL    Neutrophil % 80.1 %    Lymphocyte % 10.3 %    Monocyte % 8.0 %    Eosinophil % 1.2 %    Basophil % 0.1 %    Immature Granulocyte % 0.3 %       Specimens (From admission, onward)      None            No results found.      Assessment/Plan   POD#  1  with following issues:   Patient Active Problem List   Diagnosis    History of  delivery   .    This is a 24 year old  who presents s/p C/S for POD # 1     Post-Op care:  -Pt doing well overall  -Pain controlled  -Breastfeeding, lactation consultant available for assistance  CPM, ambulate    2. Heme:   -s/p C/S Hgb 9.9  - Acute blood loss anemia  -Asx and hemodynamically stable  -Will encourage cont PNV and increase intake of iron rich foods      3. Disposition:   Will re-evaluate tomorrow am          VTE Risk    Padua VTE Risk Score:   Caprini VTE Risk Score:   Obstetric VTE Risk Score: 1         Nelia Olmos MD  10/2/2024  9:05 AM

## 2024-10-02 NOTE — PROGRESS NOTES
[facility]   Acute Pain Rounds Note  10/2/2024    Patient name: Rachel Stephenson 24 year old female  : 2000  MRN: [id number]    Diagnosis: [unfilled]    S/P: c section    Pain modality: Duramorph    Current hospital day: Hospital Day: 2    Pain Scores: 2    Current Medications:  Scheduled Meds:   acetaminophen  1,000 mg Oral Q6H    ketorolac  30 mg Intravenous Q6H    ibuprofen  600 mg Oral Q6H    docusate sodium  100 mg Oral BID@0600,1800    prenatal vitamin with DHA  1 capsule Oral Daily     Continuous Infusions:   lactated ringers 125 mL/hr at 10/01/24 2008    dextrose in lactated ringers      oxyTOCIN in sodium chloride 0.9% Stopped (10/01/24 1600)     PRN Meds:.  nalbuphine    gabapentin    HYDROmorphone    HYDROmorphone    magnesium hydroxide    bisacodyl    ondansetron    witch hazel-glycerin    phenylephrine-min oil-bekah    simethicone    ammonia aromatic    Assessment:  Doing well    Plan:  Po meds    ABAD VALERA MD  Anesthesia Acute Pain Service [phone]

## 2024-10-03 VITALS
RESPIRATION RATE: 16 BRPM | WEIGHT: 194 LBS | TEMPERATURE: 99 F | BODY MASS INDEX: 36.63 KG/M2 | HEIGHT: 61 IN | OXYGEN SATURATION: 100 % | HEART RATE: 102 BPM | DIASTOLIC BLOOD PRESSURE: 71 MMHG | SYSTOLIC BLOOD PRESSURE: 115 MMHG

## 2024-10-03 RX ORDER — IBUPROFEN 600 MG/1
600 TABLET, FILM COATED ORAL EVERY 6 HOURS PRN
Qty: 40 TABLET | Refills: 0 | Status: SHIPPED | OUTPATIENT
Start: 2024-10-03

## 2024-10-03 RX ORDER — GABAPENTIN 300 MG/1
300 CAPSULE ORAL EVERY 8 HOURS PRN
Qty: 15 CAPSULE | Refills: 0 | Status: SHIPPED | OUTPATIENT
Start: 2024-10-03

## 2024-10-03 RX ORDER — ACETAMINOPHEN 500 MG
1000 TABLET ORAL EVERY 6 HOURS PRN
Qty: 112 TABLET | Refills: 0 | Status: SHIPPED | OUTPATIENT
Start: 2024-10-03

## 2024-10-03 NOTE — PROGRESS NOTES
[facility]  part of [facility]    OB/Gyne Post  Section Progress Note      Rachel Stephenson Patient Status:  Inpatient    2000 MRN [id number]   Location [facility] 3SE Attending Ivon Morrissey MD   Hosp Day # 1 PCP Jamila Wang MD       Subjective     Pt denies N/V/F/C/CP/SOB/palpitations, dizziness.      Good pain control. Rated at 2/10  Tolerating present diet.   Ambulating well.  Voiding freely.    Breastfeeding Yes   Formula Feeding: No   Tolerating Diet Yes   Flatus: Yes   BM: Yes  VB decreasing    Objective   Vital signs in last 24 hours:  Temp:  [98.7 °F (37.1 °C)-99.5 °F (37.5 °C)] 99.5 °F (37.5 °C)  Pulse:  [] 87  Resp:  [16] 16  BP: (105-117)/(59-66) 105/59  SpO2:  [98 %-100 %] 100 %    Input/Output:    Intake/Output Summary (Last 24 hours) at 10/2/2024 2046  Last data filed at 10/2/2024 1050  Gross per 24 hour   Intake --   Output 2150 ml   Net -2150 ml       Constitutional: comfortable  Abdomen: soft; appropriately tender; non distended  BS: Present   Uterus: fundus 1cm below umbilicus, appropriately tender  Wound/Incision:  Clean / dry / intact with steri strips; no erythema and no ecchymosis  Extremities: No calf tenderness, 1+ pitting edema.  Lochia: minimal      Results:   Labs / Path / Radiology:    Recent Results (from the past 24 hour(s))   CBC With Differential With Platelet    Collection Time: 10/02/24  6:09 AM   Result Value Ref Range    WBC 7.8 4.0 - 11.0 x10(3) uL    RBC 3.28 (L) 3.80 - 5.30 x10(6)uL    HGB 9.9 (L) 12.0 - 16.0 g/dL    HCT 29.8 (L) 35.0 - 48.0 %    MCV 90.9 80.0 - 100.0 fL    MCH 30.2 26.0 - 34.0 pg    MCHC 33.2 31.0 - 37.0 g/dL    RDW-SD 41.8 35.1 - 46.3 fL    RDW 12.8 11.0 - 15.0 %    .0 (L) 150.0 - 450.0 10(3)uL    Neutrophil Absolute Prelim 6.23 1.50 - 7.70 x10 (3) uL    Neutrophil Absolute 6.23 1.50 - 7.70 x10(3) uL    Lymphocyte Absolute 0.80 (L) 1.00 - 4.00 x10(3) uL    Monocyte Absolute 0.62 0.10 - 1.00 x10(3)  uL    Eosinophil Absolute 0.09 0.00 - 0.70 x10(3) uL    Basophil Absolute 0.01 0.00 - 0.20 x10(3) uL    Immature Granulocyte Absolute 0.02 0.00 - 1.00 x10(3) uL    Neutrophil % 80.1 %    Lymphocyte % 10.3 %    Monocyte % 8.0 %    Eosinophil % 1.2 %    Basophil % 0.1 %    Immature Granulocyte % 0.3 %       Specimens (From admission, onward)      None            No results found.      Assessment/Plan   POD#  2  with following issues:   Patient Active Problem List   Diagnosis    History of  delivery   .    This is a 24 year old  who presents s/p repeat C/S for POD # 2     Post-Op care:  -Pt doing well overall  -Pain controlled  -Breastfeeding, lactation consultant available for assistance  discharge home with discharge instructions reviewed in detail    2. Anemia:   -most likely 2/2 intra-op losses  -s/p C/S Hgb 9.9  -Asx and hemodynamically stable  -Will encourage cont PNV and increase intake of iron rich foods     3. Disposition:  Pt comfortable about going home; will d/c home today;   Rx for Motrin and Gabapentin PO given  Wound and home instructions given to patient and she verbalized understanding  Pt to call to make appt in 2 weeks for wound check and 6 wks postpartum  check  Pt to call if any concerns arise and make a n appointment for evaluation sooner if needed    Renetta Snider MD  10/3/2024

## 2024-10-03 NOTE — PROGRESS NOTES
Discharge order received from MD.    Discharge instructions and medications reviewed with patient. ID band matched with baby band. Follow up instructions with OB given. Mother verbalizes understanding of instructions. Discharged in stable condition via wheelchair.

## 2024-10-03 NOTE — PLAN OF CARE
Problem: Patient Centered Care  Goal: Patient preferences are identified and integrated in the patient's plan of care  Description: Interventions:  - What would you like us to know as we care for you?   - Provide timely, complete, and accurate information to patient/family  - Incorporate patient and family knowledge, values, beliefs, and cultural backgrounds into the planning and delivery of care  - Encourage patient/family to participate in care and decision-making at the level they choose  - Honor patient and family perspectives and choices  Outcome: Progressing     Problem: Patient/Family Goals  Goal: Patient/Family Long Term Goal  Description: Patient's Long Term Goal:     Interventions:  -   - See additional Care Plan goals for specific interventions  Outcome: Progressing  Goal: Patient/Family Short Term Goal  Description: Patient's Short Term Goal:     Interventions:   -   - See additional Care Plan goals for specific interventions  Outcome: Progressing     Problem: POSTPARTUM  Goal: Long Term Goal:Experiences normal postpartum course  Description: INTERVENTIONS:  - Assess and monitor vital signs and lab values.  - Assess fundus and lochia.  - Provide ice/sitz baths for perineum discomfort.  - Monitor healing of incision/episiotomy/laceration, and assess for signs and symptoms of infection and hematoma.  - Assess bladder function and monitor for bladder distention.  - Provide/instruct/assist with pericare as needed.  - Provide VTE prophylaxis as needed.  - Monitor bowel function.  - Encourage ambulation and provide assistance as needed.  - Assess and monitor emotional status and provide social service/psych resources as needed.  - Utilize standard precautions and use personal protective equipment as indicated. Ensure aseptic care of all intravenous lines and invasive tubes/drains.  - Obtain immunization and exposure to communicable diseases history.  Outcome: Progressing  Goal: Optimize infant feeding at the  breast  Description: INTERVENTIONS:  - Initiate breast feeding within first hour after birth.   - Monitor effectiveness of current breast feeding efforts.  - Assess support systems available to mother/family.  - Identify cultural beliefs/practices regarding lactation, letdown techniques, maternal food preferences.  - Assess mother's knowledge and previous experience with breast feeding.  - Provide information as needed about early infant feeding cues (e.g., rooting, lip smacking, sucking fingers/hand) versus late cue of crying.  - Discuss/demonstrate breast feeding aids (e.g., infant sling, nursing footstool/pillows, and breast pumps).  - Encourage mother/other family members to express feelings/concerns, and actively listen.  - Educate father/SO about benefits of breast feeding and how to manage common lactation challenges.  - Recommend avoidance of specific medications or substances incompatible with breast feeding.  - Assess and monitor for signs of nipple pain/trauma.  - Instruct and provide assistance with proper latch.  - Review techniques for milk expression (breast pumping) and storage of breast milk. Provide pumping equipment/supplies, instructions and assistance, as needed.  - Encourage rooming-in and breast feeding on demand.  - Encourage skin-to-skin contact.  - Provide LC support as needed.  - Assess for and manage engorgement.  - Provide breast feeding education handouts and information on community breast feeding support.   Outcome: Progressing  Goal: Establishment of adequate milk supply with medication/procedure interruptions  Description: INTERVENTIONS:  - Review techniques for milk expression (breast pumping).   - Provide pumping equipment/supplies, instructions, and assistance until it is safe to breastfeed infant.  Outcome: Progressing  Goal: Experiences normal breast weaning course  Description: INTERVENTIONS:  - Assess for and manage engorgement.  - Instruct on breast care.  - Provide comfort  measures.  Outcome: Progressing  Goal: Appropriate maternal -  bonding  Description: INTERVENTIONS:  - Assess caregiver- interactions.  - Assess caregiver's emotional status and coping mechanisms.  - Encourage caregiver to participate in  daily care.  - Assess support systems available to mother/family.  - Provide /case management support as needed.  Outcome: Progressing     Problem: GENITOURINARY - ADULT  Goal: Absence of urinary retention  Description: INTERVENTIONS:  - Assess patient’s ability to void and empty bladder  - Monitor intake/output and perform bladder scan as needed  - Follow urinary retention protocol/standard of care  - Consider collaborating with pharmacy to review patient's medication profile  - Implement strategies to promote bladder emptying  Outcome: Progressing    VSS, afebrile. Pt with intermittent pain relieved with Tylenol, Gabapentin and Motrin. Voiding freely. Per pt passing gas. Incision with steri strips dry and intact. Fundus is firm, U/U, bleeding is scant. Plan of care reviewed with pt. Paperwork done. Questions and concerns answered. Anticipating discharge. Will continue with plan of care.

## 2024-10-03 NOTE — DISCHARGE SUMMARY
[facility]  part of [facility]    Discharge Summary    Rachel Stephenson Patient Status:  Inpatient    2000 MRN [id number]   Location [facility] 3SE Attending Chiqui Morrissey MD   Hosp Day # 2 PCP Jamila Wagn MD     Date of Admission: 10/1/2024    Admission Diagnoses: repeat c/s  History of  delivery    Date of Discharge: 10/3/2024    Discharge Diagnoses:S/P  Section    Episode Diagnoses:   Pregnancy Problems (from 24 to present)       No problems associated with this episode.                  Hospital Course:     EDC: Estimated Date of Delivery: 10/4/24    Gestational Age: 39w4d    Date of Delivery: 10/1/2024   Time of Delivery: 8:05 AM     Antepartum complications: Pregnancy complicated by h/o C/S x 1, h/o chlamydia in early pregnancy.    Delivered By: CHIQUI MORRISSEY     Delivery Method: Caesarean Section     Delivery Procedures:   Surgical Procedures       Case IDs Date Procedure Surgeon Location Status    [id number] 10/1/24  SECTION Chiqui Morrissey MD [facility] L+D OR [facility]            Baby: female       Apgars:  1 minute:   9                  5 minutes: 9                           10 minutes:      Feeding Method:The patient is currently breastfeeding.     Intrapartum Complications: Pt represented to L&D for a scheduled repeat C/S.  She underwent an uncomplicated  and an uncomplicated PP course except for anemia.      Lacerations      Perineal lacerations: None      Vaginal laceration?: No      Cervical laceration?: No    Clitoral laceration?: No             Episiotomy: None     Placenta: Manual Removal     Postpartum complications: Anemia                  Discharge Plan:   Discharge Condition: Good    Discharge medications:  Current Discharge Medication List        New Orders    Details   acetaminophen 500 MG Oral Tab Take 2 tablets (1,000 mg total) by mouth every 6 (six) hours as needed for Pain (mild pain).      gabapentin 300 MG Oral Cap Take  1 capsule (300 mg total) by mouth every 8 (eight) hours as needed (for breakthrough severe pain).      ibuprofen 600 MG Oral Tab Take 1 tablet (600 mg total) by mouth every 6 (six) hours as needed for Pain (moderate pain).           Home Meds - Unchanged    Details   prenatal vitamin with DHA 27-0.8-228 MG Oral Cap Take 1 capsule by mouth daily.      ferrous sulfate 325 (65 FE) MG Oral Tab EC Take 1 tablet (325 mg total) by mouth daily with breakfast.                   Discharge Diet: As tolerated and General diet    Discharge Activity: Pelvic rest until cleared  further discharge instructions discussed with patient.    Follow up:     Follow Up in the Office: 2 weeks for follow up     Follow-up Information       Ivon Morrissey MD Follow up in 2 week(s).    Specialty: OBSTETRICS & GYNECOLOGY  Why: Call the office to make an appointment in 2 weeks and 6 weeks postpartum.  If you have questions or concerns call the office.  Contact information:  [address] [place] [address]  [address]  [facility] [id number] [phone]                                     Renetta Snider MD  10/3/2024

## 2024-10-03 NOTE — DISCHARGE INSTRUCTIONS
Call your healthcare provider right away for a fever over 100.4, heavy vaginal bleeding, severe or worsening pain unrelieved by medication, severe anxiety or depression, dizziness, changes in vision, severe headache, calf pain or swelling, or chest pain or shortness of breath.    Monitor your incision daily for any redness, swelling, drainage with four odor, or warmth to touch.     You are on pelvic rest for 6 weeks. This means nothing in the vagina (no intercourse, tampons, etc.) and no baths or swimming pools.    Don't do any heavy lifting (no more than the baby) until cleared by OB.

## 2024-10-30 ENCOUNTER — TELEPHONE (OUTPATIENT)
Dept: OBGYN UNIT | Facility: HOSPITAL | Age: 24
End: 2024-10-30

## (undated) DEVICE — KIWI® VACUUM DELIVERY SYSTEM, OMNICUP®: Brand: KIWI® OMNICUP®

## (undated) DEVICE — 3M™ MEDIPORE™ H SOFT CLOTH SURGICAL TAPE 2864, 4 INCH X 10 YARD (10CM X 9,14M), 12 ROLLS/CASE: Brand: 3M™ MEDIPORE™

## (undated) NOTE — LETTER
[facility]  Administration of Anesthesia  IRachel agree to be cared for by a physician anesthesiologist alone and/or with a nurse anesthetist, who is specially trained to monitor me and give me medicine to put me to sleep or keep me comfortable during my procedure    I understand that my anesthesiologist and/or anesthetist is not an employee or agent of [facility] or [employer]. He or she works for [facility]    As the patient asking for anesthesia services, I agree to:  Allow the anesthesiologist (anesthesia doctor) to give me medicine and do additional procedures as necessary. Some examples are: Starting or using an “IV” to give me medicine, fluids or blood during my procedure, and having a breathing tube placed to help me breathe when I’m asleep (intubation). In the event that my heart stops working properly, I understand that my anesthesiologist will make every effort to sustain my life, unless otherwise directed by [facility] Do Not Resuscitate documents.  Tell my anesthesia doctor before my procedure:  If I am pregnant.  The last time that I ate or drank.  iii. All of the medicines I take (including prescriptions, herbal supplements, and pills I can buy without a prescription (including street drugs/illegal medications). Failure to inform my anesthesiologist about these medicines may increase my risk of anesthetic complications.  iv.If I am allergic to anything or have had a reaction to anesthesia before.  I understand how the anesthesia medicine will help me (benefits).  I understand that with any type of anesthesia medicine there are risks:  The most common risks are: nausea, vomiting, sore throat, muscle soreness, damage to my eyes, mouth, or teeth (from breathing tube placement).  Rare risks include: remembering what happened during my procedure, allergic reactions to medications, injury to my airway, heart, lungs, vision, nerves, or  muscles and in extremely rare instances death.  My doctor has explained to me other choices available to me for my care (alternatives).  Pregnant Patients (“epidural”):  I understand that the risks of having an epidural (medicine given into my back to help control pain during labor), include itching, low blood pressure, difficulty urinating, headache or slowing of the baby’s heart. Very rare risks include infection, bleeding, seizure, irregular heart rhythms and nerve injury.  Regional Anesthesia (“spinal”, “epidural”, & “nerve blocks”):  I understand that rare but potential complications include headache, bleeding, infection, seizure, irregular heart rhythms, and nerve injury.    _____________________________________________________________________________  Patient (or Representative) Signature/Relationship to Patient  Date   Time    _____________________________________________________________________________   Name (if used)    Language/Organization   Time    _____________________________________________________________________________  Nurse Anesthetist Signature     Date   Time  _____________________________________________________________________________  Anesthesiologist Signature     Date   Time  I have discussed the procedure and information above with the patient (or patient’s representative) and answered their questions. The patient or their representative has agreed to have anesthesia services.    _____________________________________________________________________________  Witness        Date   Time  I have verified that the signature is that of the patient or patient’s representative, and that it was signed before the procedure  Patient Name: Rachel Stephenson     : 2000                 Printed: 10/1/2024 at 5:19 AM    Medical Record #: [id number]                                            Page 1 of 1  ----------ANESTHESIA CONSENT----------